# Patient Record
Sex: FEMALE | Race: BLACK OR AFRICAN AMERICAN | NOT HISPANIC OR LATINO | Employment: FULL TIME | URBAN - METROPOLITAN AREA
[De-identification: names, ages, dates, MRNs, and addresses within clinical notes are randomized per-mention and may not be internally consistent; named-entity substitution may affect disease eponyms.]

---

## 2021-05-03 ENCOUNTER — OFFICE VISIT (OUTPATIENT)
Dept: FAMILY MEDICINE CLINIC | Facility: CLINIC | Age: 51
End: 2021-05-03

## 2021-05-03 VITALS
HEIGHT: 67 IN | WEIGHT: 189.87 LBS | OXYGEN SATURATION: 97 % | TEMPERATURE: 97.5 F | BODY MASS INDEX: 29.8 KG/M2 | RESPIRATION RATE: 20 BRPM | HEART RATE: 74 BPM | SYSTOLIC BLOOD PRESSURE: 145 MMHG | DIASTOLIC BLOOD PRESSURE: 95 MMHG

## 2021-05-03 DIAGNOSIS — Z12.11 SCREEN FOR COLON CANCER: ICD-10-CM

## 2021-05-03 DIAGNOSIS — M15.8 OTHER OSTEOARTHRITIS INVOLVING MULTIPLE JOINTS: ICD-10-CM

## 2021-05-03 DIAGNOSIS — M54.50 CHRONIC BILATERAL LOW BACK PAIN WITHOUT SCIATICA: ICD-10-CM

## 2021-05-03 DIAGNOSIS — I10 ESSENTIAL HYPERTENSION: ICD-10-CM

## 2021-05-03 DIAGNOSIS — Z00.00 PREVENTATIVE HEALTH CARE: Primary | ICD-10-CM

## 2021-05-03 DIAGNOSIS — G89.29 CHRONIC BILATERAL LOW BACK PAIN WITHOUT SCIATICA: ICD-10-CM

## 2021-05-03 DIAGNOSIS — Z12.31 BREAST CANCER SCREENING BY MAMMOGRAM: ICD-10-CM

## 2021-05-03 LAB — HBA1C MFR BLD: 6 % (ref 3.5–5.6)

## 2021-05-03 PROCEDURE — 86803 HEPATITIS C AB TEST: CPT | Performed by: NURSE PRACTITIONER

## 2021-05-03 PROCEDURE — 84443 ASSAY THYROID STIM HORMONE: CPT | Performed by: NURSE PRACTITIONER

## 2021-05-03 PROCEDURE — 80061 LIPID PANEL: CPT | Performed by: NURSE PRACTITIONER

## 2021-05-03 PROCEDURE — 99386 PREV VISIT NEW AGE 40-64: CPT | Performed by: NURSE PRACTITIONER

## 2021-05-03 PROCEDURE — 80053 COMPREHEN METABOLIC PANEL: CPT | Performed by: NURSE PRACTITIONER

## 2021-05-03 PROCEDURE — 83036 HEMOGLOBIN GLYCOSYLATED A1C: CPT | Performed by: NURSE PRACTITIONER

## 2021-05-03 PROCEDURE — 85027 COMPLETE CBC AUTOMATED: CPT | Performed by: NURSE PRACTITIONER

## 2021-05-03 PROCEDURE — 36415 COLL VENOUS BLD VENIPUNCTURE: CPT | Performed by: NURSE PRACTITIONER

## 2021-05-03 RX ORDER — HYDROCODONE BITARTRATE AND ACETAMINOPHEN 10; 325 MG/1; MG/1
1 TABLET ORAL
COMMUNITY
Start: 2021-05-03 | End: 2021-06-02

## 2021-05-03 RX ORDER — TRIAMTERENE AND HYDROCHLOROTHIAZIDE 75; 50 MG/1; MG/1
1 TABLET ORAL DAILY
COMMUNITY
Start: 2021-02-22 | End: 2021-11-01 | Stop reason: SDUPTHER

## 2021-05-03 RX ORDER — MELOXICAM 7.5 MG/1
7.5 TABLET ORAL DAILY
COMMUNITY
Start: 2021-04-01 | End: 2021-11-01 | Stop reason: SDUPTHER

## 2021-05-03 NOTE — PROGRESS NOTES
"Chief Complaint  Establish Care (new pt est care htn anxiety ), Hypertension, Fatigue, Depression, and Anxiety    Subjective          Joey Rush presents to [facility]  History of Present Illness     New patient presents today to establish care. The patient denies dysuria, constipation, GERD, chest pain, shortness of air, palpitations and swelling of the extremities. Previous pt of Dr. Zuniga.     She works as / now doing 12 hour shifts 6 days/week. Forced to work on off days. She states she is \"broken down,\" she walked  22 blocks recently and was down for 3 days, her body hurts and she can not do it any longer, does not want to lose her job. She would like FMLA for 5 days/week/8 hour shifts/max 40 hours per week.  The symptoms are causing exacerbation and anxiety/depression although symptoms are manageable and tolerable at this time.     Low back back injury when in , also with osteoarthritis, on meloxicam daily and norco prn per pain mgmt at [facility], Dr. Redman, effective for pain most days.     She does not recall when she had her last mammogram and pap has been several years.  She does report having a cervical cuff s/p partial hysterectomy      The following portions of the patient's history were reviewed and updated as appropriate: allergies, current medications, past family history, past medical history, past social history, past surgical history and problem list.      Past Surgical History:   Procedure Laterality Date   • HYSTERECTOMY  2002    PARTIAL UTERUS ONLY     Past Medical History:   Diagnosis Date   • Allergic    • Anxiety disorder    • Contusion, knee    • Convulsion (CMS/HCC)    • Dehydration    • Depression    • Dizziness    • Fatigue    • Heat exhaustion    • Hypertension    • Insomnia, persistent    • Knee pain    • Low back pain    • Seizure (CMS/HCC)      PHQ-9 Depression Screening  Little interest or pleasure in doing " "things? 1   Feeling down, depressed, or hopeless? 1   Trouble falling or staying asleep, or sleeping too much? 1   Feeling tired or having little energy? 2   Poor appetite or overeating? 0   Feeling bad about yourself - or that you are a failure or have let yourself or your family down? 0   Trouble concentrating on things, such as reading the newspaper or watching television? 1   Moving or speaking so slowly that other people could have noticed? Or the opposite - being so fidgety or restless that you have been moving around a lot more than usual? 1   Thoughts that you would be better off dead, or of hurting yourself in some way? 0   PHQ-9 Total Score 7   If you checked off any problems, how difficult have these problems made it for you to do your work, take care of things at home, or get along with other people? Somewhat difficult         Objective   Vital Signs:   /95 (BP Location: Left arm, Patient Position: Sitting)   Pulse 74   Temp 97.5 °F (36.4 °C) (Temporal)   Resp 20   Ht 170.2 cm (67\")   Wt 86.1 kg (189 lb 13.9 oz)   SpO2 97%   BMI 29.74 kg/m²         Physical Exam  Vitals and nursing note reviewed.   Constitutional:       General: She is not in acute distress.     Appearance: She is well-developed. She is not diaphoretic.   Eyes:      Pupils: Pupils are equal, round, and reactive to light.   Neck:      Thyroid: No thyromegaly.      Vascular: No JVD.   Cardiovascular:      Rate and Rhythm: Normal rate and regular rhythm.      Heart sounds: Normal heart sounds. No murmur heard.     Pulmonary:      Effort: Pulmonary effort is normal. No respiratory distress.      Breath sounds: Normal breath sounds.   Abdominal:      General: Bowel sounds are normal. There is no distension.      Palpations: Abdomen is soft.      Tenderness: There is no abdominal tenderness.   Musculoskeletal:         General: No tenderness. Normal range of motion.      Cervical back: Normal range of motion and neck supple. "   Skin:     General: Skin is warm and dry.   Neurological:      Mental Status: She is alert and oriented to person, place, and time.      Sensory: No sensory deficit.   Psychiatric:         Behavior: Behavior normal.         Thought Content: Thought content normal.         Judgment: Judgment normal.          Result Review :                 Assessment and Plan    Diagnoses and all orders for this visit:    1. Preventative health care (Primary)  -     Hemoglobin A1c  -     Hepatitis C Antibody    2. Screen for colon cancer  -     Cologuard - Stool, Per Rectum; Future    3. Essential hypertension  -     Lipid Panel  -     Comprehensive Metabolic Panel  -     CBC (No Diff)  -     TSH    4. Breast cancer screening by mammogram  -     Mammo Screening Digital Tomosynthesis Bilateral With CAD; Future    5. Other osteoarthritis involving multiple joints    6. Chronic bilateral low back pain without sciatica    Other orders  -     Cancel: Ambulatory Referral to Gynecology       1. Ok for FMLA, pt should only work 5 days/wk, 8 hour shifts, max 40 hours/week, patient will bring paperwork for completion  2. Otherwise conditions stable, patient does not need med rf at this time, Continue Maxide  3. Collect labs today, will notify patient results  4. Cont melox, norco, f/u with pain management as directed  5.  Ordered mammogram and Cologuard  6.  Patient will return for Pap smear in next 1 to 2 months      I spent 35 minutes caring for Joey on this date of service. This time includes time spent by me in the following activities:preparing for the visit, reviewing tests, performing a medically appropriate examination and/or evaluation , counseling and educating the patient/family/caregiver, ordering medications, tests, or procedures and documenting information in the medical record     Follow Up   Return in about 6 months (around 11/3/2021) for schedule pap nxt 1-2 mo. f/u on HTN in 6mo.  Patient was given instructions and  counseling regarding her condition or for health maintenance advice. Please see specific information pulled into the AVS if appropriate.

## 2021-05-04 LAB
ALBUMIN SERPL-MCNC: 4.6 G/DL (ref 3.5–5.2)
ALBUMIN/GLOB SERPL: 1.5 G/DL
ALP SERPL-CCNC: 59 U/L (ref 39–117)
ALT SERPL W P-5'-P-CCNC: 23 U/L (ref 1–33)
ANION GAP SERPL CALCULATED.3IONS-SCNC: 10.1 MMOL/L (ref 5–15)
AST SERPL-CCNC: 22 U/L (ref 1–32)
BILIRUB SERPL-MCNC: 0.3 MG/DL (ref 0–1.2)
BUN SERPL-MCNC: 10 MG/DL (ref 6–20)
BUN/CREAT SERPL: 12.2 (ref 7–25)
CALCIUM SPEC-SCNC: 9.9 MG/DL (ref 8.6–10.5)
CHLORIDE SERPL-SCNC: 98 MMOL/L (ref 98–107)
CHOLEST SERPL-MCNC: 266 MG/DL (ref 0–200)
CO2 SERPL-SCNC: 28.9 MMOL/L (ref 22–29)
CREAT SERPL-MCNC: 0.82 MG/DL (ref 0.57–1)
DEPRECATED RDW RBC AUTO: 39.6 FL (ref 37–54)
ERYTHROCYTE [DISTWIDTH] IN BLOOD BY AUTOMATED COUNT: 12.4 % (ref 12.3–15.4)
GFR SERPL CREATININE-BSD FRML MDRD: 90 ML/MIN/1.73
GLOBULIN UR ELPH-MCNC: 3.1 GM/DL
GLUCOSE SERPL-MCNC: 135 MG/DL (ref 65–99)
HCT VFR BLD AUTO: 40.1 % (ref 34–46.6)
HCV AB SER DONR QL: NORMAL
HDLC SERPL-MCNC: 73 MG/DL (ref 40–60)
HGB BLD-MCNC: 13.5 G/DL (ref 12–15.9)
LDLC SERPL CALC-MCNC: 175 MG/DL (ref 0–100)
LDLC/HDLC SERPL: 2.36 {RATIO}
MCH RBC QN AUTO: 29.9 PG (ref 26.6–33)
MCHC RBC AUTO-ENTMCNC: 33.7 G/DL (ref 31.5–35.7)
MCV RBC AUTO: 88.7 FL (ref 79–97)
PLATELET # BLD AUTO: 407 10*3/MM3 (ref 140–450)
PMV BLD AUTO: 9.8 FL (ref 6–12)
POTASSIUM SERPL-SCNC: 3.5 MMOL/L (ref 3.5–5.2)
PROT SERPL-MCNC: 7.7 G/DL (ref 6–8.5)
RBC # BLD AUTO: 4.52 10*6/MM3 (ref 3.77–5.28)
SODIUM SERPL-SCNC: 137 MMOL/L (ref 136–145)
TRIGL SERPL-MCNC: 104 MG/DL (ref 0–150)
TSH SERPL DL<=0.05 MIU/L-ACNC: 1.89 UIU/ML (ref 0.27–4.2)
VLDLC SERPL-MCNC: 18 MG/DL (ref 5–40)
WBC # BLD AUTO: 3.69 10*3/MM3 (ref 3.4–10.8)

## 2021-05-13 ENCOUNTER — TELEPHONE (OUTPATIENT)
Dept: FAMILY MEDICINE CLINIC | Facility: CLINIC | Age: 51
End: 2021-05-13

## 2021-05-13 NOTE — TELEPHONE ENCOUNTER
Hub ok to share:    Patient may  [facility] paperwork.  Is there somewhere she would like for us to fax to?  If yes, please provide fax number.

## 2021-06-03 ENCOUNTER — OFFICE VISIT (OUTPATIENT)
Dept: FAMILY MEDICINE CLINIC | Facility: CLINIC | Age: 51
End: 2021-06-03

## 2021-06-03 VITALS
WEIGHT: 195.2 LBS | BODY MASS INDEX: 30.64 KG/M2 | DIASTOLIC BLOOD PRESSURE: 87 MMHG | HEIGHT: 67 IN | SYSTOLIC BLOOD PRESSURE: 136 MMHG | HEART RATE: 85 BPM | OXYGEN SATURATION: 100 %

## 2021-06-03 DIAGNOSIS — Z01.419 WELL WOMAN EXAM WITH ROUTINE GYNECOLOGICAL EXAM: Primary | ICD-10-CM

## 2021-06-03 PROCEDURE — 99213 OFFICE O/P EST LOW 20 MIN: CPT | Performed by: NURSE PRACTITIONER

## 2021-06-03 NOTE — PROGRESS NOTES
"Chief Complaint  Gynecologic Exam    Subjective          Joey Rush presents to [facility] for   History of Present Illness       Patient here for annual/gynecologic exam with Pap smear, mammogram previously ordered but not completed yet.  Patient with history of partial hysterectomy d/t fibroids with cervical cuff and ovaries present.  Pt denies breast pain, performs monthly self exams, denies abdominal pain, dysuria, hematuria, or vaginal discharge.     cologuard negative 5/22/21    Would also like to review labs completed 5/30/2021      The following portions of the patient's history were reviewed and updated as appropriate: allergies, current medications, past family history, past medical history, past social history, past surgical history and problem list.    Past Medical History:   Diagnosis Date   • Allergic    • Anxiety disorder    • Contusion, knee    • Convulsion (CMS/HCC)    • Dehydration    • Depression    • Dizziness    • Fatigue    • Heat exhaustion    • Hypertension    • Insomnia, persistent    • Knee pain    • Low back pain    • Seizure (CMS/HCC)      Past Surgical History:   Procedure Laterality Date   • HYSTERECTOMY  2002    PARTIAL UTERUS ONLY     Family History   Problem Relation Age of Onset   • Diabetes Other    • Hypertension Other    • Cancer Other      Social History     Tobacco Use   • Smoking status: Never Smoker   • Smokeless tobacco: Never Used   Substance Use Topics   • Alcohol use: Yes     Comment: social       Current Outpatient Medications:   •  meloxicam (MOBIC) 7.5 MG tablet, Take 7.5 mg by mouth Daily., Disp: , Rfl:   •  triamterene-hydrochlorothiazide (MAXZIDE) 75-50 MG per tablet, Take 1 tablet by mouth Daily., Disp: , Rfl:     Objective   Vital Signs:   /87 (BP Location: Left arm, Patient Position: Sitting, Cuff Size: Adult)   Pulse 85   Ht 170.2 cm (67.01\")   Wt 88.5 kg (195 lb 3.2 oz)   SpO2 100%   BMI 30.57 kg/m²       Physical " Exam  Exam conducted with a chaperone present.   Constitutional:       General: She is not in acute distress.     Appearance: She is well-developed.   HENT:      Head: Normocephalic.   Eyes:      Pupils: Pupils are equal, round, and reactive to light.   Neck:      Thyroid: No thyromegaly.   Cardiovascular:      Rate and Rhythm: Normal rate and regular rhythm.      Heart sounds: Normal heart sounds. No murmur heard.     Pulmonary:      Effort: Pulmonary effort is normal. No respiratory distress.      Breath sounds: Normal breath sounds.   Abdominal:      General: Bowel sounds are normal. There is no distension.      Palpations: Abdomen is soft.      Tenderness: There is no abdominal tenderness.   Genitourinary:     General: Normal vulva.      Labia:         Right: No rash or lesion.         Left: No rash or lesion.       Vagina: Normal. No vaginal discharge.      Cervix: Normal.      Uterus: Absent.       Adnexa: Right adnexa normal and left adnexa normal.        Right: No mass or tenderness.          Left: No mass or tenderness.        Rectum: Normal.      Comments: Pap collected, breast exam completed. Pt power well.   Musculoskeletal:         General: Normal range of motion.      Cervical back: Normal range of motion.   Skin:     General: Skin is warm and dry.   Neurological:      Mental Status: She is alert and oriented to person, place, and time.   Psychiatric:         Behavior: Behavior normal.         Thought Content: Thought content normal.         Judgment: Judgment normal.          Result Review :     No visits with results within 7 Day(s) from this visit.   Latest known visit with results is:   Office Visit on 05/03/2021   Component Date Value Ref Range Status   • Total Cholesterol 05/03/2021 266* 0 - 200 mg/dL Final   • Triglycerides 05/03/2021 104  0 - 150 mg/dL Final   • HDL Cholesterol 05/03/2021 73* 40 - 60 mg/dL Final   • LDL Cholesterol  05/03/2021 175* 0 - 100 mg/dL Final   • VLDL Cholesterol  05/03/2021 18  5 - 40 mg/dL Final   • LDL/HDL Ratio 05/03/2021 2.36   Final   • Glucose 05/03/2021 135* 65 - 99 mg/dL Final   • BUN 05/03/2021 10  6 - 20 mg/dL Final   • Creatinine 05/03/2021 0.82  0.57 - 1.00 mg/dL Final   • Sodium 05/03/2021 137  136 - 145 mmol/L Final   • Potassium 05/03/2021 3.5  3.5 - 5.2 mmol/L Final   • Chloride 05/03/2021 98  98 - 107 mmol/L Final   • CO2 05/03/2021 28.9  22.0 - 29.0 mmol/L Final   • Calcium 05/03/2021 9.9  8.6 - 10.5 mg/dL Final   • Total Protein 05/03/2021 7.7  6.0 - 8.5 g/dL Final   • Albumin 05/03/2021 4.60  3.50 - 5.20 g/dL Final   • ALT (SGPT) 05/03/2021 23  1 - 33 U/L Final   • AST (SGOT) 05/03/2021 22  1 - 32 U/L Final   • Alkaline Phosphatase 05/03/2021 59  39 - 117 U/L Final   • Total Bilirubin 05/03/2021 0.3  0.0 - 1.2 mg/dL Final   • eGFR   Amer 05/03/2021 90  >60 mL/min/1.73 Final   • Globulin 05/03/2021 3.1  gm/dL Final   • A/G Ratio 05/03/2021 1.5  g/dL Final   • BUN/Creatinine Ratio 05/03/2021 12.2  7.0 - 25.0 Final   • Anion Gap 05/03/2021 10.1  5.0 - 15.0 mmol/L Final   • WBC 05/03/2021 3.69  3.40 - 10.80 10*3/mm3 Final   • RBC 05/03/2021 4.52  3.77 - 5.28 10*6/mm3 Final   • Hemoglobin 05/03/2021 13.5  12.0 - 15.9 g/dL Final   • Hematocrit 05/03/2021 40.1  34.0 - 46.6 % Final   • MCV 05/03/2021 88.7  79.0 - 97.0 fL Final   • MCH 05/03/2021 29.9  26.6 - 33.0 pg Final   • MCHC 05/03/2021 33.7  31.5 - 35.7 g/dL Final   • RDW 05/03/2021 12.4  12.3 - 15.4 % Final   • RDW-SD 05/03/2021 39.6  37.0 - 54.0 fl Final   • MPV 05/03/2021 9.8  6.0 - 12.0 fL Final   • Platelets 05/03/2021 407  140 - 450 10*3/mm3 Final   • TSH 05/03/2021 1.890  0.270 - 4.200 uIU/mL Final   • Hemoglobin A1C 05/03/2021 6.0* 3.5 - 5.6 % Final   • Hepatitis C Ab 05/03/2021 Non-Reactive  Non-Reactive Final                       Assessment and Plan    Diagnoses and all orders for this visit:    1. Well woman exam with routine gynecological exam (Primary)  Comments:  1. pap collected, will  notify results.    2. Patient to schedule mammogram.    3. Cologuard negative.   4. Reviewed labs, work on HHD, limit carbs/sweets        I spent 20 minutes caring for Joey Rush on this date of service. This time includes time spent by me in the following activities: preparing for the visit, reviewing tests, performing a medically appropriate examination and/or evaluation , counseling and educating the patient/family/caregiver, ordering medications, tests, or procedures and documenting information in the medical record        Follow Up     No follow-ups on file.  Patient was given instructions and counseling regarding her condition or for health maintenance advice. Please see specific information pulled into the AVS if appropriate.

## 2021-06-06 LAB
AGE GDLN ACOG TESTING: NORMAL
CYTOLOGIST CVX/VAG CYTO: NORMAL
CYTOLOGY CVX/VAG DOC CYTO: NORMAL
CYTOLOGY CVX/VAG DOC THIN PREP: NORMAL
DX ICD CODE: NORMAL
HIV 1 & 2 AB SER-IMP: NORMAL
HPV I/H RISK 4 DNA CVX QL PROBE+SIG AMP: NEGATIVE
OTHER STN SPEC: NORMAL
STAT OF ADQ CVX/VAG CYTO-IMP: NORMAL

## 2021-07-07 ENCOUNTER — HOSPITAL ENCOUNTER (OUTPATIENT)
Dept: MAMMOGRAPHY | Facility: HOSPITAL | Age: 51
Discharge: HOME | End: 2021-07-07
Admitting: NURSE PRACTITIONER

## 2021-07-07 DIAGNOSIS — Z12.31 BREAST CANCER SCREENING BY MAMMOGRAM: ICD-10-CM

## 2021-07-07 PROCEDURE — 77063 BREAST TOMOSYNTHESIS BI: CPT

## 2021-07-07 PROCEDURE — 77067 SCR MAMMO BI INCL CAD: CPT

## 2021-11-01 ENCOUNTER — OFFICE VISIT (OUTPATIENT)
Dept: FAMILY MEDICINE CLINIC | Facility: CLINIC | Age: 51
End: 2021-11-01

## 2021-11-01 VITALS
HEART RATE: 88 BPM | OXYGEN SATURATION: 100 % | WEIGHT: 204.2 LBS | SYSTOLIC BLOOD PRESSURE: 133 MMHG | BODY MASS INDEX: 32.05 KG/M2 | HEIGHT: 67 IN | DIASTOLIC BLOOD PRESSURE: 88 MMHG

## 2021-11-01 DIAGNOSIS — R61 NIGHT SWEATS: ICD-10-CM

## 2021-11-01 DIAGNOSIS — M15.8 OTHER OSTEOARTHRITIS INVOLVING MULTIPLE JOINTS: ICD-10-CM

## 2021-11-01 DIAGNOSIS — G47.09 OTHER INSOMNIA: ICD-10-CM

## 2021-11-01 DIAGNOSIS — I10 ESSENTIAL HYPERTENSION: Primary | ICD-10-CM

## 2021-11-01 PROCEDURE — 99214 OFFICE O/P EST MOD 30 MIN: CPT | Performed by: NURSE PRACTITIONER

## 2021-11-01 RX ORDER — MELOXICAM 7.5 MG/1
7.5 TABLET ORAL DAILY
Qty: 90 TABLET | Refills: 1 | Status: SHIPPED | OUTPATIENT
Start: 2021-11-01 | End: 2022-09-23

## 2021-11-01 RX ORDER — HYDROCODONE BITARTRATE AND ACETAMINOPHEN 10; 325 MG/1; MG/1
TABLET ORAL
COMMUNITY
Start: 2021-10-29

## 2021-11-01 RX ORDER — TRIAMTERENE AND HYDROCHLOROTHIAZIDE 75; 50 MG/1; MG/1
1 TABLET ORAL DAILY
Qty: 90 TABLET | Refills: 1 | Status: SHIPPED | OUTPATIENT
Start: 2021-11-01 | End: 2022-06-03

## 2021-11-01 NOTE — PROGRESS NOTES
Chief Complaint  Hypertension, Follow-up (6 mo), Insomnia (pt reports that she has tried otc meds and isn't helping.  also expc night sweats that come and go.), and Immunizations (pt would like to discuss getting the flu vaccine)    Subjective          Joey Ruhs presents to [facility] for   History of Present Illness     HTN, stable on meds and takes as directed, denies chest pain, headache, shortness of air, palpitations and swelling of extremities.     Insomnia, This is a new problem, pt reports having hotflashes/sweating. She is also taking care of a grandchild. Treatments tried: zquil, OTC sleep aids ineffective.     Elevated blood glucose, per previous labs, but not fasting. hgba1c 6.0      The following portions of the patient's history were reviewed and updated as appropriate: allergies, current medications, past family history, past medical history, past social history, past surgical history and problem list.    Past Medical History:   Diagnosis Date   • Allergic    • Anxiety disorder    • Contusion, knee    • Convulsion (HCC)    • Dehydration    • Depression    • Dizziness    • Fatigue    • Heat exhaustion    • Hypertension    • Insomnia, persistent    • Knee pain    • Low back pain    • Seizure (HCC)      Past Surgical History:   Procedure Laterality Date   • BREAST BIOPSY Left    • HYSTERECTOMY  2002    PARTIAL UTERUS ONLY     Family History   Problem Relation Age of Onset   • Diabetes Other    • Hypertension Other    • Cancer Other    • Breast cancer Paternal Aunt      Social History     Tobacco Use   • Smoking status: Never Smoker   • Smokeless tobacco: Never Used   Substance Use Topics   • Alcohol use: Yes     Comment: social       Current Outpatient Medications:   •  HYDROcodone-acetaminophen (NORCO)  MG per tablet, , Disp: , Rfl:   •  meloxicam (MOBIC) 7.5 MG tablet, Take 1 tablet by mouth Daily., Disp: 90 tablet, Rfl: 1  •  triamterene-hydrochlorothiazide  "(MAXZIDE) 75-50 MG per tablet, Take 1 tablet by mouth Daily., Disp: 90 tablet, Rfl: 1    Objective   Vital Signs:   /88 (BP Location: Left arm, Patient Position: Sitting, Cuff Size: Large Adult)   Pulse 88   Ht 170.2 cm (67.01\")   Wt 92.6 kg (204 lb 3.2 oz)   SpO2 100%   BMI 31.97 kg/m²       Physical Exam  Vitals and nursing note reviewed.   Constitutional:       General: She is not in acute distress.     Appearance: She is well-developed. She is not diaphoretic.   Eyes:      Pupils: Pupils are equal, round, and reactive to light.   Neck:      Thyroid: No thyromegaly.      Vascular: No JVD.   Cardiovascular:      Rate and Rhythm: Normal rate and regular rhythm.      Heart sounds: Normal heart sounds. No murmur heard.      Pulmonary:      Effort: Pulmonary effort is normal. No respiratory distress.      Breath sounds: Normal breath sounds.   Abdominal:      General: Bowel sounds are normal. There is no distension.      Palpations: Abdomen is soft.      Tenderness: There is no abdominal tenderness.   Musculoskeletal:         General: No tenderness. Normal range of motion.      Cervical back: Normal range of motion and neck supple.   Skin:     General: Skin is warm and dry.   Neurological:      Mental Status: She is alert and oriented to person, place, and time.      Sensory: No sensory deficit.   Psychiatric:         Behavior: Behavior normal.         Thought Content: Thought content normal.         Judgment: Judgment normal.          Result Review :     No visits with results within 7 Day(s) from this visit.   Latest known visit with results is:   Office Visit on 06/03/2021   Component Date Value Ref Range Status   • Age Gdln ACOG Testing 06/03/2021 30-65   Final   • Diagnosis 06/03/2021 Comment   Final    NEGATIVE FOR INTRAEPITHELIAL LESION OR MALIGNANCY.   • Specimen adequacy: 06/03/2021 Comment   Final    Satisfactory for evaluation.  Endocervical and/or squamous metaplastic  cells (endocervical " component) are present.   • Clinician Provided ICD-10: 06/03/2021 Comment   Final    Z01.419   • Performed by: 06/03/2021 Comment   Final    Mindi Gregorio, Cytotechnologist (ASCP)   • . 06/03/2021 .   Final   • Note: 06/03/2021 Comment   Final    The Pap smear is a screening test designed to aid in the detection of  premalignant and malignant conditions of the uterine cervix.  It is not a  diagnostic procedure and should not be used as the sole means of detecting  cervical cancer.  Both false-positive and false-negative reports do occur.   • Method: 06/03/2021 Comment   Final    This liquid based ThinPrep(R) pap test was screened with the  use of an image guided system.   • HPV Aptima 06/03/2021 Negative  Negative Final    This nucleic acid amplification test detects fourteen high-risk  HPV types (16,18,31,33,35,39,45,51,52,56,58,59,66,68) without  differentiation.                       Assessment and Plan    Diagnoses and all orders for this visit:    1. Essential hypertension (Primary)  Comments:  bp stable, cont/rf maxzide. reviewed prev labs and stable.       2. Other insomnia  Comments:  likely 2/2 perimenopausal s/s. notify if no improvement with black cohosh.     3. Night sweats  Comments:  rec trial black cohosh otc. consider hormone workup.     4. Other osteoarthritis involving multiple joints  Comments:  stable with prn use mobic, cont and rf.     Other orders  -     triamterene-hydrochlorothiazide (MAXZIDE) 75-50 MG per tablet; Take 1 tablet by mouth Daily.  Dispense: 90 tablet; Refill: 1  -     meloxicam (MOBIC) 7.5 MG tablet; Take 1 tablet by mouth Daily.  Dispense: 90 tablet; Refill: 1      rec flu shot, pt will consider getting at her pharmacy.     I spent 30 minutes caring for Joey Rush on this date of service. This time includes time spent by me in the following activities: preparing for the visit, reviewing tests, performing a medically appropriate examination and/or evaluation , counseling  and educating the patient/family/caregiver, ordering medications, tests, or procedures and documenting information in the medical record        Follow Up     Return in about 6 months (around 5/1/2022) for htn. .  Patient was given instructions and counseling regarding her condition or for health maintenance advice. Please see specific information pulled into the AVS if appropriate.      EMR Dragon transcription disclaimer:  Some of this encounter note is an electronic transcription translation of spoken language to printed text. The electronic translation of spoken language may permit erroneous, or at times, nonsensical words or phrases to be inadvertently transcribed; Although I have reviewed the note for such errors some may still exist.

## 2022-06-02 NOTE — TELEPHONE ENCOUNTER
Pt. Requesting a refill of   triamterene-hydrochlorothiazide (MAXZIDE) 75-50 MG per tablet     06/02/22 [facility]

## 2022-06-03 RX ORDER — TRIAMTERENE AND HYDROCHLOROTHIAZIDE 75; 50 MG/1; MG/1
1 TABLET ORAL DAILY
Qty: 90 TABLET | Refills: 1 | Status: SHIPPED | OUTPATIENT
Start: 2022-06-03 | End: 2022-12-06

## 2022-09-23 RX ORDER — MELOXICAM 7.5 MG/1
7.5 TABLET ORAL DAILY
Qty: 90 TABLET | Refills: 1 | Status: SHIPPED | OUTPATIENT
Start: 2022-09-23

## 2022-12-06 DIAGNOSIS — I10 ESSENTIAL HYPERTENSION: Primary | ICD-10-CM

## 2022-12-06 RX ORDER — TRIAMTERENE AND HYDROCHLOROTHIAZIDE 75; 50 MG/1; MG/1
1 TABLET ORAL DAILY
Qty: 90 TABLET | Refills: 0 | Status: SHIPPED | OUTPATIENT
Start: 2022-12-06

## 2023-03-11 DIAGNOSIS — I10 ESSENTIAL HYPERTENSION: ICD-10-CM

## 2023-03-13 RX ORDER — TRIAMTERENE AND HYDROCHLOROTHIAZIDE 75; 50 MG/1; MG/1
1 TABLET ORAL DAILY
Qty: 90 TABLET | Refills: 0 | OUTPATIENT
Start: 2023-03-13

## 2023-05-01 ENCOUNTER — TELEPHONE (OUTPATIENT)
Dept: FAMILY MEDICINE CLINIC | Facility: CLINIC | Age: 53
End: 2023-05-01
Payer: COMMERCIAL

## 2023-05-01 NOTE — TELEPHONE ENCOUNTER
"Attempted to call pt to confirm 5/3 appt, no answer, \"call cannot be completed at this time, please try your call again later\"  "

## 2023-05-03 ENCOUNTER — OFFICE VISIT (OUTPATIENT)
Dept: FAMILY MEDICINE CLINIC | Facility: CLINIC | Age: 53
End: 2023-05-03
Payer: COMMERCIAL

## 2023-05-03 VITALS
WEIGHT: 193 LBS | SYSTOLIC BLOOD PRESSURE: 126 MMHG | HEIGHT: 67 IN | OXYGEN SATURATION: 97 % | DIASTOLIC BLOOD PRESSURE: 84 MMHG | HEART RATE: 95 BPM | BODY MASS INDEX: 30.29 KG/M2

## 2023-05-03 DIAGNOSIS — Z23 NEED FOR SHINGLES VACCINE: ICD-10-CM

## 2023-05-03 DIAGNOSIS — Z00.00 PREVENTATIVE HEALTH CARE: Primary | ICD-10-CM

## 2023-05-03 DIAGNOSIS — M15.8 OTHER OSTEOARTHRITIS INVOLVING MULTIPLE JOINTS: ICD-10-CM

## 2023-05-03 DIAGNOSIS — Z23 NEED FOR TDAP VACCINATION: ICD-10-CM

## 2023-05-03 DIAGNOSIS — Z12.31 BREAST CANCER SCREENING BY MAMMOGRAM: ICD-10-CM

## 2023-05-03 DIAGNOSIS — I10 ESSENTIAL HYPERTENSION: ICD-10-CM

## 2023-05-03 DIAGNOSIS — R73.09 ABNORMAL GLUCOSE: ICD-10-CM

## 2023-05-03 PROCEDURE — 83036 HEMOGLOBIN GLYCOSYLATED A1C: CPT | Performed by: NURSE PRACTITIONER

## 2023-05-03 PROCEDURE — 80061 LIPID PANEL: CPT | Performed by: NURSE PRACTITIONER

## 2023-05-03 PROCEDURE — 80050 GENERAL HEALTH PANEL: CPT | Performed by: NURSE PRACTITIONER

## 2023-05-03 RX ORDER — MELOXICAM 7.5 MG/1
7.5 TABLET ORAL DAILY
Qty: 90 TABLET | Refills: 3 | Status: SHIPPED | OUTPATIENT
Start: 2023-05-03

## 2023-05-03 RX ORDER — TRIAMTERENE AND HYDROCHLOROTHIAZIDE 75; 50 MG/1; MG/1
1 TABLET ORAL DAILY
Qty: 90 TABLET | Refills: 3 | Status: SHIPPED | OUTPATIENT
Start: 2023-05-03

## 2023-05-03 NOTE — PROGRESS NOTES
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  Pap up-to-date, negative in 2021, last mammogram 2021    HTN, stable on meds and takes as directed, denies chest pain, headache, shortness of air, palpitations and swelling of extremities.     Arthritis of multiple joints, uses meloxicam as needed. Goes to pain mgmt as well, uses norco BID prn.  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    Past Medical History:   Diagnosis Date   • Allergic    • Anxiety disorder    • Contusion, knee    • Convulsion    • Dehydration    • Depression    • Dizziness    • Fatigue    • Heat exhaustion    • Hypertension    • Insomnia, persistent    • Knee pain    • Low back pain    • Seizure      Past Surgical History:   Procedure Laterality Date   • BREAST BIOPSY Left    • HYSTERECTOMY  2002    PARTIAL UTERUS ONLY     Family History   Problem Relation Age of Onset   • Diabetes Other    • Hypertension Other    • Cancer Other    • Breast cancer Paternal Aunt      Social History     Tobacco Use " "  • Smoking status: Never   • Smokeless tobacco: Never   Substance Use Topics   • Alcohol use: Yes     Comment: social       Current Outpatient Medications:   •  HYDROcodone-acetaminophen (NORCO)  MG per tablet, , Disp: , Rfl:   •  meloxicam (MOBIC) 7.5 MG tablet, Take 1 tablet by mouth Daily., Disp: 90 tablet, Rfl: 3  •  triamterene-hydrochlorothiazide (MAXZIDE) 75-50 MG per tablet, Take 1 tablet by mouth Daily., Disp: 90 tablet, Rfl: 3    Objective   Vital Signs:   /84 (BP Location: Left arm, Patient Position: Sitting, Cuff Size: Large Adult)   Pulse 95   Ht 170.2 cm (67.01\")   Wt 87.5 kg (193 lb)   SpO2 97%   BMI 30.22 kg/m²           Physical Exam  Constitutional:       General: She is not in acute distress.     Appearance: Normal appearance. She is well-developed. She is not ill-appearing or diaphoretic.   HENT:      Head: Normocephalic.   Eyes:      Conjunctiva/sclera: Conjunctivae normal.      Pupils: Pupils are equal, round, and reactive to light.   Neck:      Thyroid: No thyromegaly.      Vascular: No JVD.   Cardiovascular:      Rate and Rhythm: Normal rate and regular rhythm.      Heart sounds: Normal heart sounds. No murmur heard.  Pulmonary:      Effort: Pulmonary effort is normal. No respiratory distress.      Breath sounds: Normal breath sounds. No wheezing or rhonchi.   Abdominal:      General: Bowel sounds are normal. There is no distension.      Palpations: Abdomen is soft.      Tenderness: There is no abdominal tenderness.   Musculoskeletal:         General: Tenderness (OA multi joints, mild craig rom) present. No swelling. Normal range of motion.      Cervical back: Normal range of motion and neck supple. No tenderness.   Lymphadenopathy:      Cervical: No cervical adenopathy.   Skin:     General: Skin is warm and dry.      Coloration: Skin is not jaundiced.      Findings: No erythema or rash.   Neurological:      General: No focal deficit present.      Mental Status: She is alert " and oriented to person, place, and time. Mental status is at baseline.      Sensory: No sensory deficit.   Psychiatric:         Mood and Affect: Mood normal.         Behavior: Behavior normal.         Thought Content: Thought content normal.         Judgment: Judgment normal.          Result Review :     Office Visit on 05/03/2023   Component Date Value Ref Range Status   • Total Cholesterol 05/03/2023 255 (H)  0 - 200 mg/dL Final   • Triglycerides 05/03/2023 161 (H)  0 - 150 mg/dL Final   • HDL Cholesterol 05/03/2023 72 (H)  40 - 60 mg/dL Final   • LDL Cholesterol  05/03/2023 154 (H)  0 - 100 mg/dL Final   • VLDL Cholesterol 05/03/2023 29  5 - 40 mg/dL Final   • LDL/HDL Ratio 05/03/2023 2.09   Final   • Glucose 05/03/2023 89  65 - 99 mg/dL Final   • BUN 05/03/2023 10  6 - 20 mg/dL Final   • Creatinine 05/03/2023 0.78  0.57 - 1.00 mg/dL Final   • Sodium 05/03/2023 138  136 - 145 mmol/L Final   • Potassium 05/03/2023 4.1  3.5 - 5.2 mmol/L Final   • Chloride 05/03/2023 105  98 - 107 mmol/L Final   • CO2 05/03/2023 23.7  22.0 - 29.0 mmol/L Final   • Calcium 05/03/2023 9.8  8.6 - 10.5 mg/dL Final   • Total Protein 05/03/2023 7.2  6.0 - 8.5 g/dL Final   • Albumin 05/03/2023 4.4  3.5 - 5.2 g/dL Final   • ALT (SGPT) 05/03/2023 28  1 - 33 U/L Final   • AST (SGOT) 05/03/2023 20  1 - 32 U/L Final   • Alkaline Phosphatase 05/03/2023 53  39 - 117 U/L Final   • Total Bilirubin 05/03/2023 <0.2  0.0 - 1.2 mg/dL Final   • Globulin 05/03/2023 2.8  gm/dL Final   • A/G Ratio 05/03/2023 1.6  g/dL Final   • BUN/Creatinine Ratio 05/03/2023 12.8  7.0 - 25.0 Final   • Anion Gap 05/03/2023 9.3  5.0 - 15.0 mmol/L Final   • eGFR 05/03/2023 92.1  >60.0 mL/min/1.73 Final   • WBC 05/03/2023 3.00 (L)  3.40 - 10.80 10*3/mm3 Final   • RBC 05/03/2023 4.49  3.77 - 5.28 10*6/mm3 Final   • Hemoglobin 05/03/2023 13.5  12.0 - 15.9 g/dL Final   • Hematocrit 05/03/2023 40.0  34.0 - 46.6 % Final   • MCV 05/03/2023 89.1  79.0 - 97.0 fL Final   • MCH  05/03/2023 30.1  26.6 - 33.0 pg Final   • MCHC 05/03/2023 33.8  31.5 - 35.7 g/dL Final   • RDW 05/03/2023 12.2 (L)  12.3 - 15.4 % Final   • RDW-SD 05/03/2023 39.9  37.0 - 54.0 fl Final   • MPV 05/03/2023 9.8  6.0 - 12.0 fL Final   • Platelets 05/03/2023 336  140 - 450 10*3/mm3 Final   • TSH 05/03/2023 1.550  0.270 - 4.200 uIU/mL Final   • Hemoglobin A1C 05/03/2023 6.00 (H)  4.80 - 5.60 % Final                  BMI is >= 30 and <35. (Class 1 Obesity). The following options were offered after discussion;: exercise counseling/recommendations and nutrition counseling/recommendations           Assessment and Plan    Diagnoses and all orders for this visit:    1. Preventative health care (Primary)  Comments:  Shingrix No. 1 today, RTC 10 weeks for Shingrix No. 2  Tdap today  Pap/cologuard due 2024  Mammogram ordered  Labs today as ordered, will notify results    Orders:  -     Lipid Panel  -     Comprehensive Metabolic Panel  -     CBC (No Diff)  -     TSH  -     Hemoglobin A1c    2. Need for Tdap vaccination  -     Tdap Vaccine Greater Than or Equal To 8yo IM    3. Need for shingles vaccine  -     Shingrix Vaccine    4. Essential hypertension  Comments:  BP stable, continue and refill Maxide  Orders:  -     triamterene-hydrochlorothiazide (MAXZIDE) 75-50 MG per tablet; Take 1 tablet by mouth Daily.  Dispense: 90 tablet; Refill: 3  -     Lipid Panel  -     Comprehensive Metabolic Panel  -     CBC (No Diff)  -     TSH    5. Breast cancer screening by mammogram  -     Mammo Screening Digital Tomosynthesis Bilateral With CAD; Future    6. Other osteoarthritis involving multiple joints  Comments:  Continue with pain management for Norco  Refill meloxicam to use as needed    Orders:  -     meloxicam (MOBIC) 7.5 MG tablet; Take 1 tablet by mouth Daily.  Dispense: 90 tablet; Refill: 3    7. Abnormal glucose  -     Hemoglobin A1c    Work note provided to work only 8-hour shifts, 5 days/week.  We will complete [place] paperwork if  needed      I spent 30 minutes caring for Joey Rush on this date of service. This time includes time spent by me in the following activities: preparing for the visit, reviewing tests, performing a medically appropriate examination and/or evaluation , counseling and educating the patient/family/caregiver, ordering medications, tests, or procedures and documenting information in the medical record        Follow Up     Return in about 6 months (around 11/3/2023) for Annual physical, RTC 10 weeks for Shingrix No. 2.  Patient was given instructions and counseling regarding her condition or for health maintenance advice. Please see specific information pulled into the AVS if appropriate.        Part of this note may be an electronic transcription/translation of spoken language to printed text using the Dragon Dictation System

## 2023-05-03 NOTE — PROGRESS NOTES
Injection  Injections performed in left arm by Neha Olivas MA. Patient tolerated the procedure well without complications.  05/03/23   Neha Olivas MA      Venipuncture Blood Specimen Collection  Venipuncture performed in right arm by Neha Olivas MA with good hemostasis. Patient tolerated the procedure well without complications.   05/03/23   Neha Olivas MA

## 2023-05-04 LAB
ALBUMIN SERPL-MCNC: 4.4 G/DL (ref 3.5–5.2)
ALBUMIN/GLOB SERPL: 1.6 G/DL
ALP SERPL-CCNC: 53 U/L (ref 39–117)
ALT SERPL W P-5'-P-CCNC: 28 U/L (ref 1–33)
ANION GAP SERPL CALCULATED.3IONS-SCNC: 9.3 MMOL/L (ref 5–15)
AST SERPL-CCNC: 20 U/L (ref 1–32)
BILIRUB SERPL-MCNC: <0.2 MG/DL (ref 0–1.2)
BUN SERPL-MCNC: 10 MG/DL (ref 6–20)
BUN/CREAT SERPL: 12.8 (ref 7–25)
CALCIUM SPEC-SCNC: 9.8 MG/DL (ref 8.6–10.5)
CHLORIDE SERPL-SCNC: 105 MMOL/L (ref 98–107)
CHOLEST SERPL-MCNC: 255 MG/DL (ref 0–200)
CO2 SERPL-SCNC: 23.7 MMOL/L (ref 22–29)
CREAT SERPL-MCNC: 0.78 MG/DL (ref 0.57–1)
DEPRECATED RDW RBC AUTO: 39.9 FL (ref 37–54)
EGFRCR SERPLBLD CKD-EPI 2021: 92.1 ML/MIN/1.73
ERYTHROCYTE [DISTWIDTH] IN BLOOD BY AUTOMATED COUNT: 12.2 % (ref 12.3–15.4)
GLOBULIN UR ELPH-MCNC: 2.8 GM/DL
GLUCOSE SERPL-MCNC: 89 MG/DL (ref 65–99)
HBA1C MFR BLD: 6 % (ref 4.8–5.6)
HCT VFR BLD AUTO: 40 % (ref 34–46.6)
HDLC SERPL-MCNC: 72 MG/DL (ref 40–60)
HGB BLD-MCNC: 13.5 G/DL (ref 12–15.9)
LDLC SERPL CALC-MCNC: 154 MG/DL (ref 0–100)
LDLC/HDLC SERPL: 2.09 {RATIO}
MCH RBC QN AUTO: 30.1 PG (ref 26.6–33)
MCHC RBC AUTO-ENTMCNC: 33.8 G/DL (ref 31.5–35.7)
MCV RBC AUTO: 89.1 FL (ref 79–97)
PLATELET # BLD AUTO: 336 10*3/MM3 (ref 140–450)
PMV BLD AUTO: 9.8 FL (ref 6–12)
POTASSIUM SERPL-SCNC: 4.1 MMOL/L (ref 3.5–5.2)
PROT SERPL-MCNC: 7.2 G/DL (ref 6–8.5)
RBC # BLD AUTO: 4.49 10*6/MM3 (ref 3.77–5.28)
SODIUM SERPL-SCNC: 138 MMOL/L (ref 136–145)
TRIGL SERPL-MCNC: 161 MG/DL (ref 0–150)
TSH SERPL DL<=0.05 MIU/L-ACNC: 1.55 UIU/ML (ref 0.27–4.2)
VLDLC SERPL-MCNC: 29 MG/DL (ref 5–40)
WBC NRBC COR # BLD: 3 10*3/MM3 (ref 3.4–10.8)

## 2023-05-08 ENCOUNTER — TELEPHONE (OUTPATIENT)
Dept: FAMILY MEDICINE CLINIC | Facility: CLINIC | Age: 53
End: 2023-05-08

## 2023-05-08 NOTE — TELEPHONE ENCOUNTER
"Caller: Joey Rush    Relationship: Self    Best call back number: [phone](HUSBANDS NUMBER)      What form or medical record are you requesting: RESTRICTIONS FOR WORK    Who is requesting this form or medical record from you:EMPLOYER    How would you like to receive the form or medical records (pick-up, mail, fax):PICK-UP    Timeframe paperwork needed: ASAP    Additional notes: PATIENT STATED THAT HER EMPLOYER TOLD HER THAT THE NOTE FROM 5/3/23 FROM CANDY GARRIDO NEEDED TO BE RE-WORDED. PATIENT STATED THAT HER EMPLOYER TOLD HER THE NOTE SAID \"IT IS MY MEDICAL OPINION\" AND THEY CAN NOT ACCEPT AN OPINION. PATIENT STATED THAT IT JUST NEEDS TO SAY THE WORK RESTRICTIONS. PATIENT STATES THAT HER  CHIDI DE LEON WILL BE COMING TO  THE PAPERWORK WHENEVER IT IS READY.    "

## 2023-05-08 NOTE — TELEPHONE ENCOUNTER
Caller: Kajal Rush     Relationship: Self     Best call back number: [phone](HUSBANDS NUMBER)        What form or medical record are you requesting: RESTRICTIONS FOR WORK     Who is requesting this form or medical record from you:EMPLOYER     How would you like to receive the form or medical records (pick-up, mail, fax):PICK-UP     Timeframe paperwork needed: ASAP     Additional notes: PATIENT CALLED TO INFORM OFFICE THAT WORDING HAS TO INCLUDE:  DUE TO MEDICAL ISSUE KAJAL CAN ONLY WORK 8 HOURS A DAY.

## 2023-05-23 ENCOUNTER — HOSPITAL ENCOUNTER (OUTPATIENT)
Dept: MAMMOGRAPHY | Facility: HOSPITAL | Age: 53
Discharge: HOME | End: 2023-05-23
Admitting: NURSE PRACTITIONER
Payer: COMMERCIAL

## 2023-05-23 DIAGNOSIS — Z12.31 BREAST CANCER SCREENING BY MAMMOGRAM: ICD-10-CM

## 2023-05-23 PROCEDURE — 77063 BREAST TOMOSYNTHESIS BI: CPT

## 2023-05-23 PROCEDURE — 77067 SCR MAMMO BI INCL CAD: CPT

## 2023-05-26 DIAGNOSIS — R92.8 ABNORMALITY OF RIGHT BREAST ON SCREENING MAMMOGRAM: Primary | ICD-10-CM

## 2023-06-01 ENCOUNTER — HOSPITAL ENCOUNTER (OUTPATIENT)
Dept: MAMMOGRAPHY | Facility: HOSPITAL | Age: 53
Discharge: HOME | End: 2023-06-01

## 2023-06-01 ENCOUNTER — HOSPITAL ENCOUNTER (OUTPATIENT)
Dept: ULTRASOUND IMAGING | Facility: HOSPITAL | Age: 53
Discharge: HOME | End: 2023-06-01

## 2023-06-01 DIAGNOSIS — R92.8 ABNORMALITY OF RIGHT BREAST ON SCREENING MAMMOGRAM: ICD-10-CM

## 2023-06-01 PROCEDURE — 77065 DX MAMMO INCL CAD UNI: CPT

## 2023-06-01 PROCEDURE — G0279 TOMOSYNTHESIS, MAMMO: HCPCS

## 2023-08-07 ENCOUNTER — TELEPHONE (OUTPATIENT)
Dept: FAMILY MEDICINE CLINIC | Facility: CLINIC | Age: 53
End: 2023-08-07

## 2023-08-07 NOTE — TELEPHONE ENCOUNTER
Caller: Joey Rush    Relationship to patient: Self    Best call back number: [phone]     Chief complaint: PATIENT NEEDS 2ND SHINGLES SHOT    Type of visit: NURSE/MA    Requested date: ASAP     Additional notes:PLEASE CALL PATIENT TO SCHEDULE

## 2024-03-14 ENCOUNTER — TELEPHONE (OUTPATIENT)
Dept: FAMILY MEDICINE CLINIC | Facility: CLINIC | Age: 54
End: 2024-03-14

## 2024-03-14 NOTE — TELEPHONE ENCOUNTER
Caller: CHIDI DE LEON    Relationship: Emergency Contact    Best call back number: [phone]    What form or medical record are you requesting: [facility] PAPERWORK DROPPED OFF ON 3/14/24    Who is requesting this form or medical record from you: EMPLOYER    How would you like to receive the form or medical records (pick-up, mail, fax):     Additional notes:CALLING TO INFORM OFFICE THAT DAUGHTER OLI CHACON GOES TO [facility] IN [place] [phone]

## 2024-04-22 ENCOUNTER — TELEPHONE (OUTPATIENT)
Dept: FAMILY MEDICINE CLINIC | Facility: CLINIC | Age: 54
End: 2024-04-22
Payer: COMMERCIAL

## 2024-04-22 DIAGNOSIS — I10 ESSENTIAL HYPERTENSION: ICD-10-CM

## 2024-04-22 NOTE — LETTER
April 23, 2024     Lauripatricia ZEYAD Victor Manuel  [address] [place] [address]    Patient: Joey Rush   YOB: 1970   Date of Visit: 4/22/2024        To whom it may concern I have changed the dates based on patients medical condition to not be able to return to work until 6/3/24 if any questions or concerns please contact our office.        PRAVEENA Hyde/ADALBERTO Solis Amanda, RegSched Rep  04/22/24 1531  Signed    Caller: Joey Rush    Relationship: Self    Best call back number: [phone]    What is the best time to reach you: ANYTIME    Who are you requesting to speak with (clinical staff, provider,  specific staff member): CLINICAL STAFF    Do you know the name of the person who called: PATIENT     What was the call regarding: PATIENT CALLED TO CHECK ON WHETHER HER FMLA FORMS WERE READY FOR PICKUP     Is it okay if the provider responds through MyChart: NO

## 2024-04-22 NOTE — TELEPHONE ENCOUNTER
Caller: Joey Rush    Relationship: Self    Best call back number: [phone]    What is the best time to reach you: ANYTIME    Who are you requesting to speak with (clinical staff, provider,  specific staff member): CLINICAL STAFF    Do you know the name of the person who called: PATIENT     What was the call regarding: PATIENT CALLED TO CHECK ON WHETHER HER FMLA FORMS WERE READY FOR PICKUP     Is it okay if the provider responds through MyChart: NO

## 2024-04-23 NOTE — TELEPHONE ENCOUNTER
Changed dates no VM set up for patient. Forms are up front ready for .   Thanks Besty!   I completely agree with that plan.  Just a heads up that she has seen Nydia in the distant past and I referred her back to Nydia about a year ago to discuss Tslim pumps w [employer] and she also failed that appointment with Nydia. Chantell is super nice to work with when you can get her in but that is a recurrent issue.  Amena

## 2024-04-24 RX ORDER — TRIAMTERENE AND HYDROCHLOROTHIAZIDE 75; 50 MG/1; MG/1
1 TABLET ORAL DAILY
Qty: 90 TABLET | Refills: 3 | Status: SHIPPED | OUTPATIENT
Start: 2024-04-24

## 2025-05-24 DIAGNOSIS — I10 ESSENTIAL HYPERTENSION: ICD-10-CM

## 2025-05-27 RX ORDER — TRIAMTERENE AND HYDROCHLOROTHIAZIDE 75; 50 MG/1; MG/1
1 TABLET ORAL DAILY
Qty: 90 TABLET | Refills: 3 | Status: SHIPPED | OUTPATIENT
Start: 2025-05-27 | End: 2025-06-02

## 2025-05-31 DIAGNOSIS — I10 ESSENTIAL HYPERTENSION: ICD-10-CM

## 2025-06-02 RX ORDER — TRIAMTERENE AND HYDROCHLOROTHIAZIDE 75; 50 MG/1; MG/1
1 TABLET ORAL DAILY
Qty: 90 TABLET | Refills: 3 | Status: SHIPPED | OUTPATIENT
Start: 2025-06-02

## 2025-07-02 ENCOUNTER — LAB (OUTPATIENT)
Dept: FAMILY MEDICINE CLINIC | Facility: CLINIC | Age: 55
End: 2025-07-02
Payer: COMMERCIAL

## 2025-07-02 ENCOUNTER — OFFICE VISIT (OUTPATIENT)
Dept: FAMILY MEDICINE CLINIC | Facility: CLINIC | Age: 55
End: 2025-07-02
Payer: COMMERCIAL

## 2025-07-02 VITALS
HEART RATE: 84 BPM | OXYGEN SATURATION: 96 % | HEIGHT: 67 IN | BODY MASS INDEX: 31.86 KG/M2 | DIASTOLIC BLOOD PRESSURE: 90 MMHG | SYSTOLIC BLOOD PRESSURE: 126 MMHG | WEIGHT: 203 LBS

## 2025-07-02 DIAGNOSIS — Z12.31 BREAST CANCER SCREENING BY MAMMOGRAM: ICD-10-CM

## 2025-07-02 DIAGNOSIS — Z12.11 COLON CANCER SCREENING: ICD-10-CM

## 2025-07-02 DIAGNOSIS — Z00.00 PREVENTATIVE HEALTH CARE: Primary | ICD-10-CM

## 2025-07-02 DIAGNOSIS — I10 ESSENTIAL HYPERTENSION: ICD-10-CM

## 2025-07-02 DIAGNOSIS — M54.16 LUMBAR RADICULOPATHY: ICD-10-CM

## 2025-07-02 DIAGNOSIS — R73.03 PREDIABETES: ICD-10-CM

## 2025-07-02 PROCEDURE — 36415 COLL VENOUS BLD VENIPUNCTURE: CPT | Performed by: NURSE PRACTITIONER

## 2025-07-02 PROCEDURE — 83036 HEMOGLOBIN GLYCOSYLATED A1C: CPT | Performed by: NURSE PRACTITIONER

## 2025-07-02 PROCEDURE — 80061 LIPID PANEL: CPT | Performed by: NURSE PRACTITIONER

## 2025-07-02 PROCEDURE — 80050 GENERAL HEALTH PANEL: CPT | Performed by: NURSE PRACTITIONER

## 2025-07-02 RX ORDER — HYDROCODONE BITARTRATE AND ACETAMINOPHEN 7.5; 325 MG/1; MG/1
1 TABLET ORAL
COMMUNITY
Start: 2025-06-21 | End: 2025-07-21

## 2025-07-02 NOTE — PROGRESS NOTES
Chief Complaint  Chief Complaint   Patient presents with    Annual Exam     Pt is doing well today  Last mammo 6/1/23  Can not remember last pap smear- get done thru gyn           Subjective          Joey Rush presents to [facility] for   History of Present Illness    Patient presents for annual exam, she is overall doing well but reports exhaustion due to working long hours, stress at home, taking care of her grandson. She has a hx of partial hysterectomy, pap is through GYN. Last mammogram 2023     HTN, stable on meds and takes as directed, denies chest pain, headache, shortness of air, palpitations and swelling of extremities.      Arthritis of multiple joints, uses meloxicam as needed. Goes to pain mgmt as well for chronic pain, lumbar radiculopathy/spondylosis, uses norco BID prn.  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 She has been provided FMLA in the past to work only 8-hour shifts and 5 days/week, she is requesting a renewal work note/FMLA. Pt's daughter was in a major semi accident and pt now has guardianship of her autistic grandson, she is requesting 2 days/mo for his care and medical appt's      The following portions of the patient's history were reviewed and updated as appropriate: allergies, current medications, past family history, past medical history, past social history, past surgical history and problem list.    Past Medical History:   Diagnosis Date    Allergic     Anxiety disorder     Contusion, knee     Convulsion     Dehydration     Depression     Dizziness     Fatigue     Heat exhaustion     Hypertension     Insomnia, persistent     Knee pain     Low back pain     Seizure      Past Surgical History:   Procedure Laterality Date    BREAST BIOPSY Left     HYSTERECTOMY  2002    PARTIAL UTERUS ONLY     Family History   Problem Relation Age of Onset    Breast cancer Mother 68    Breast  "cancer Paternal Aunt     Diabetes Other     Hypertension Other     Cancer Other      Social History     Tobacco Use    Smoking status: Never    Smokeless tobacco: Never   Substance Use Topics    Alcohol use: Yes     Comment: social       Current Outpatient Medications:     HYDROcodone-acetaminophen (NORCO) 7.5-325 MG per tablet, Take 1 tablet by mouth., Disp: , Rfl:     meloxicam (MOBIC) 7.5 MG tablet, Take 1 tablet by mouth Daily., Disp: 90 tablet, Rfl: 3    triamterene-hydrochlorothiazide (MAXZIDE) 75-50 MG per tablet, TAKE 1 TABLET BY MOUTH DAILY, Disp: 90 tablet, Rfl: 3    Objective   Vital Signs:   Vitals:    07/02/25 0824   BP: 126/90   BP Location: Left arm   Patient Position: Sitting   Cuff Size: Adult   Pulse: 84   SpO2: 96%   Weight: 92.1 kg (203 lb)   Height: 170.2 cm (67\")   PainSc: 0-No pain       Body mass index is 31.79 kg/m².    Physical Exam  Constitutional:       General: She is not in acute distress.     Appearance: Normal appearance. She is well-developed. She is not ill-appearing or diaphoretic.   HENT:      Head: Normocephalic.   Eyes:      Conjunctiva/sclera: Conjunctivae normal.      Pupils: Pupils are equal, round, and reactive to light.   Neck:      Thyroid: No thyromegaly.      Vascular: No JVD.   Cardiovascular:      Rate and Rhythm: Normal rate and regular rhythm.      Heart sounds: Normal heart sounds. No murmur heard.  Pulmonary:      Effort: Pulmonary effort is normal. No respiratory distress.      Breath sounds: Normal breath sounds. No wheezing or rhonchi.   Abdominal:      General: Bowel sounds are normal. There is no distension.      Palpations: Abdomen is soft.      Tenderness: There is no abdominal tenderness.   Musculoskeletal:         General: Tenderness (chronic multi joint, L-spine, mild craig rom) present. No swelling. Normal range of motion.      Cervical back: Normal range of motion and neck supple. No tenderness.   Lymphadenopathy:      Cervical: No cervical adenopathy. "   Skin:     General: Skin is warm and dry.      Coloration: Skin is not jaundiced.      Findings: No erythema or rash.   Neurological:      General: No focal deficit present.      Mental Status: She is alert and oriented to person, place, and time. Mental status is at baseline.      Sensory: No sensory deficit.      Motor: No weakness.      Gait: Gait normal.   Psychiatric:         Mood and Affect: Mood normal.         Behavior: Behavior normal.         Thought Content: Thought content normal.         Judgment: Judgment normal.          Result Review :     No visits with results within 7 Day(s) from this visit.   Latest known visit with results is:   Office Visit on 05/03/2023   Component Date Value Ref Range Status    Total Cholesterol 05/03/2023 255 (H)  0 - 200 mg/dL Final    Triglycerides 05/03/2023 161 (H)  0 - 150 mg/dL Final    HDL Cholesterol 05/03/2023 72 (H)  40 - 60 mg/dL Final    LDL Cholesterol  05/03/2023 154 (H)  0 - 100 mg/dL Final    VLDL Cholesterol 05/03/2023 29  5 - 40 mg/dL Final    LDL/HDL Ratio 05/03/2023 2.09   Final    Glucose 05/03/2023 89  65 - 99 mg/dL Final    BUN 05/03/2023 10  6 - 20 mg/dL Final    Creatinine 05/03/2023 0.78  0.57 - 1.00 mg/dL Final    Sodium 05/03/2023 138  136 - 145 mmol/L Final    Potassium 05/03/2023 4.1  3.5 - 5.2 mmol/L Final    Chloride 05/03/2023 105  98 - 107 mmol/L Final    CO2 05/03/2023 23.7  22.0 - 29.0 mmol/L Final    Calcium 05/03/2023 9.8  8.6 - 10.5 mg/dL Final    Total Protein 05/03/2023 7.2  6.0 - 8.5 g/dL Final    Albumin 05/03/2023 4.4  3.5 - 5.2 g/dL Final    ALT (SGPT) 05/03/2023 28  1 - 33 U/L Final    AST (SGOT) 05/03/2023 20  1 - 32 U/L Final    Alkaline Phosphatase 05/03/2023 53  39 - 117 U/L Final    Total Bilirubin 05/03/2023 <0.2  0.0 - 1.2 mg/dL Final    Globulin 05/03/2023 2.8  gm/dL Final    A/G Ratio 05/03/2023 1.6  g/dL Final    BUN/Creatinine Ratio 05/03/2023 12.8  7.0 - 25.0 Final    Anion Gap 05/03/2023 9.3  5.0 - 15.0 mmol/L Final     eGFR 05/03/2023 92.1  >60.0 mL/min/1.73 Final    WBC 05/03/2023 3.00 (L)  3.40 - 10.80 10*3/mm3 Final    RBC 05/03/2023 4.49  3.77 - 5.28 10*6/mm3 Final    Hemoglobin 05/03/2023 13.5  12.0 - 15.9 g/dL Final    Hematocrit 05/03/2023 40.0  34.0 - 46.6 % Final    MCV 05/03/2023 89.1  79.0 - 97.0 fL Final    MCH 05/03/2023 30.1  26.6 - 33.0 pg Final    MCHC 05/03/2023 33.8  31.5 - 35.7 g/dL Final    RDW 05/03/2023 12.2 (L)  12.3 - 15.4 % Final    RDW-SD 05/03/2023 39.9  37.0 - 54.0 fl Final    MPV 05/03/2023 9.8  6.0 - 12.0 fL Final    Platelets 05/03/2023 336  140 - 450 10*3/mm3 Final    TSH 05/03/2023 1.550  0.270 - 4.200 uIU/mL Final    Hemoglobin A1C 05/03/2023 6.00 (H)  4.80 - 5.60 % Final                  BMI is >= 30 and <35. (Class 1 Obesity). The following options were offered after discussion;: exercise counseling/recommendations and nutrition counseling/recommendations           Assessment and Plan    Diagnoses and all orders for this visit:    1. Preventative health care (Primary)  -     Lipid Panel  -     Comprehensive Metabolic Panel  -     CBC (No Diff)  -     TSH  -     Hemoglobin A1c    2. Colon cancer screening  -     Cologuard - Stool, Per Rectum; Future    3. Breast cancer screening by mammogram  -     Mammo Screening Digital Tomosynthesis Bilateral With CAD; Future    4. Essential hypertension  -     Lipid Panel  -     Comprehensive Metabolic Panel  -     CBC (No Diff)  -     TSH    5. Lumbar radiculopathy    6. Prediabetes  -     Hemoglobin A1c    Other orders  -     Pneumococcal Conjugate Vaccine 20-Valent All        Provided letter today for 8-hour shifts and 5 days/week max  For her autistic grandson Valdez, will allow 2 days/mo off for his care-she will bring FMLA forms if needed  Return for shingrix #2  Labs today, will notify results  Pt to schedule pap with GYN  Age appropriate preventative counseling provided, including healthy lifestyle modifications and exercise        I spent 30  minutes caring for Joey Rush on this date of service. This time includes time spent by me in the following activities: preparing for the visit, reviewing tests, performing a medically appropriate examination and/or evaluation , counseling and educating the patient/family/caregiver, ordering medications, tests, or procedures and documenting information in the medical record        Follow Up     Return in about 1 year (around 7/2/2026) for Annual physical. pt to RTC on a friday for shingrix #2.  Patient was given instructions and counseling regarding her condition or for health maintenance advice. Please see specific information pulled into the AVS if appropriate.        Part of this note may be an electronic transcription/translation of spoken language to printed text using the Dragon Dictation System

## 2025-07-03 LAB
ALBUMIN SERPL-MCNC: 4.6 G/DL (ref 3.5–5.2)
ALBUMIN/GLOB SERPL: 1.4 G/DL
ALP SERPL-CCNC: 52 U/L (ref 39–117)
ALT SERPL W P-5'-P-CCNC: 29 U/L (ref 1–33)
ANION GAP SERPL CALCULATED.3IONS-SCNC: 13 MMOL/L (ref 5–15)
AST SERPL-CCNC: 29 U/L (ref 1–32)
BILIRUB SERPL-MCNC: 0.3 MG/DL (ref 0–1.2)
BUN SERPL-MCNC: 16 MG/DL (ref 6–20)
BUN/CREAT SERPL: 19.8 (ref 7–25)
CALCIUM SPEC-SCNC: 9.9 MG/DL (ref 8.6–10.5)
CHLORIDE SERPL-SCNC: 101 MMOL/L (ref 98–107)
CHOLEST SERPL-MCNC: 285 MG/DL (ref 0–200)
CO2 SERPL-SCNC: 25 MMOL/L (ref 22–29)
CREAT SERPL-MCNC: 0.81 MG/DL (ref 0.57–1)
DEPRECATED RDW RBC AUTO: 39.8 FL (ref 37–54)
EGFRCR SERPLBLD CKD-EPI 2021: 86.9 ML/MIN/1.73
ERYTHROCYTE [DISTWIDTH] IN BLOOD BY AUTOMATED COUNT: 12.1 % (ref 12.3–15.4)
GLOBULIN UR ELPH-MCNC: 3.2 GM/DL
GLUCOSE SERPL-MCNC: 98 MG/DL (ref 65–99)
HBA1C MFR BLD: 6.2 % (ref 4.8–5.6)
HCT VFR BLD AUTO: 40.1 % (ref 34–46.6)
HDLC SERPL-MCNC: 66 MG/DL (ref 40–60)
HGB BLD-MCNC: 13.4 G/DL (ref 12–15.9)
LDLC SERPL CALC-MCNC: 201 MG/DL (ref 0–100)
LDLC/HDLC SERPL: 3 {RATIO}
MCH RBC QN AUTO: 30.5 PG (ref 26.6–33)
MCHC RBC AUTO-ENTMCNC: 33.4 G/DL (ref 31.5–35.7)
MCV RBC AUTO: 91.3 FL (ref 79–97)
PLATELET # BLD AUTO: 320 10*3/MM3 (ref 140–450)
PMV BLD AUTO: 9.8 FL (ref 6–12)
POTASSIUM SERPL-SCNC: 4.1 MMOL/L (ref 3.5–5.2)
PROT SERPL-MCNC: 7.8 G/DL (ref 6–8.5)
RBC # BLD AUTO: 4.39 10*6/MM3 (ref 3.77–5.28)
SODIUM SERPL-SCNC: 139 MMOL/L (ref 136–145)
TRIGL SERPL-MCNC: 106 MG/DL (ref 0–150)
TSH SERPL DL<=0.05 MIU/L-ACNC: 2.1 UIU/ML (ref 0.27–4.2)
VLDLC SERPL-MCNC: 18 MG/DL (ref 5–40)
WBC NRBC COR # BLD AUTO: 3.81 10*3/MM3 (ref 3.4–10.8)

## 2025-07-12 ENCOUNTER — RESULTS FOLLOW-UP (OUTPATIENT)
Dept: FAMILY MEDICINE CLINIC | Facility: CLINIC | Age: 55
End: 2025-07-12

## 2025-07-25 ENCOUNTER — CLINICAL SUPPORT (OUTPATIENT)
Dept: FAMILY MEDICINE CLINIC | Facility: CLINIC | Age: 55
End: 2025-07-25
Payer: COMMERCIAL

## 2025-07-30 ENCOUNTER — TELEPHONE (OUTPATIENT)
Dept: FAMILY MEDICINE CLINIC | Facility: CLINIC | Age: 55
End: 2025-07-30

## 2025-07-31 ENCOUNTER — OFFICE VISIT (OUTPATIENT)
Dept: FAMILY MEDICINE CLINIC | Facility: CLINIC | Age: 55
End: 2025-07-31
Payer: COMMERCIAL

## 2025-07-31 ENCOUNTER — PATIENT ROUNDING (BHMG ONLY) (OUTPATIENT)
Dept: FAMILY MEDICINE CLINIC | Facility: CLINIC | Age: 55
End: 2025-07-31
Payer: COMMERCIAL

## 2025-07-31 VITALS
DIASTOLIC BLOOD PRESSURE: 68 MMHG | BODY MASS INDEX: 30.79 KG/M2 | SYSTOLIC BLOOD PRESSURE: 102 MMHG | OXYGEN SATURATION: 96 % | HEIGHT: 67 IN | WEIGHT: 196.2 LBS | HEART RATE: 74 BPM | RESPIRATION RATE: 18 BRPM

## 2025-07-31 DIAGNOSIS — R22.2 SUPRACLAVICULAR MASS: ICD-10-CM

## 2025-07-31 DIAGNOSIS — S93.401A SPRAIN OF RIGHT ANKLE, UNSPECIFIED LIGAMENT, INITIAL ENCOUNTER: Primary | ICD-10-CM

## 2025-07-31 RX ORDER — HYDROCODONE BITARTRATE AND ACETAMINOPHEN 7.5; 325 MG/1; MG/1
1 TABLET ORAL
COMMUNITY
Start: 2025-07-22 | End: 2025-08-21

## 2025-07-31 NOTE — PROGRESS NOTES
07/31/25       My name is September and I am  with [facility].    I am writing to ask about your recent visit.    Tell me about your visit with us. What things went well?    We're always looking for ways to make our patients' experiences even better. Do you have recommendations on ways we may improve?     Overall were you satisfied with your visit to our practice?     Thank you for taking the time to answer a few questions today.     Thank you, and have a great day.  September

## 2025-07-31 NOTE — PROGRESS NOTES
Chief Complaint  Chief Complaint   Patient presents with    Ankle Pain     ED Follow from 7/29 for Right Ankle Pain    Mass     2 small masses around the collar bone on the left side           Subjective          Joey Rush presents to [facility] for   History of Present Illness    Patient presented to [place] ED per EMS on 7/29/2025 for right ankle pain and inability to walk after a fall off of a porch while working. She works as a  and walks her routes.  Xray showed soft tissue swelling, no other findings, she was placed in a cam boot, recommended nsaids, ice, rest. She is off work currently, will need a letter and possibly FMLA.  She has been using ibuprofen and norco she gets from pain mgmt.     Also has complaints of two masses just above the left collarbone, one has been present for about a year and enlarging, the other she noticed in the last 2 weeks. They are nontender, she denies any acute or recent illness, denies axillary/groin tenderness or masses      The following portions of the patient's history were reviewed and updated as appropriate: allergies, current medications, past family history, past medical history, past social history, past surgical history and problem list.    Past Medical History:   Diagnosis Date    Allergic     Anxiety disorder     Contusion, knee     Convulsion     Dehydration     Depression     Dizziness     Fatigue     Heat exhaustion     Hypertension     Insomnia, persistent     Knee pain     Low back pain     Seizure      Past Surgical History:   Procedure Laterality Date    BREAST BIOPSY Left     HYSTERECTOMY  2002    PARTIAL UTERUS ONLY     Family History   Problem Relation Age of Onset    Breast cancer Mother 68    Breast cancer Paternal Aunt     Diabetes Other     Hypertension Other     Cancer Other      Social History     Tobacco Use    Smoking status: Never     Passive exposure: Past    Smokeless tobacco: Never  "  Substance Use Topics    Alcohol use: Yes     Comment: social       Current Outpatient Medications:     HYDROcodone-acetaminophen (NORCO) 7.5-325 MG per tablet, Take 1 tablet by mouth., Disp: , Rfl:     meloxicam (MOBIC) 7.5 MG tablet, Take 1 tablet by mouth Daily., Disp: 90 tablet, Rfl: 3    triamterene-hydrochlorothiazide (MAXZIDE) 75-50 MG per tablet, TAKE 1 TABLET BY MOUTH DAILY, Disp: 90 tablet, Rfl: 3    Objective   Vital Signs:   Vitals:    07/31/25 1053   BP: 102/68   BP Location: Left arm   Patient Position: Sitting   Cuff Size: Large Adult   Pulse: 74   Resp: 18   SpO2: 96%   Weight: 89 kg (196 lb 3.2 oz)   Height: 170.2 cm (67.01\")   PainSc: 6    PainLoc: Ankle       Body mass index is 30.72 kg/m².    Physical Exam  Vitals and nursing note reviewed.   Constitutional:       General: She is not in acute distress.     Appearance: She is well-developed. She is not diaphoretic.   Neck:      Thyroid: No thyromegaly.      Vascular: No JVD.   Musculoskeletal:         General: Swelling, tenderness and signs of injury (R lateral malleolus edema and severe ttp, severe craig rom. wearing mid leg cam boot) present. Normal range of motion.   Lymphadenopathy:      Upper Body:      Left upper body: Supraclavicular adenopathy (x2 masses present, firm, mobile) present.   Skin:     General: Skin is warm and dry.      Findings: No bruising.   Neurological:      Mental Status: She is alert and oriented to person, place, and time.      Sensory: No sensory deficit.   Psychiatric:         Behavior: Behavior normal.         Thought Content: Thought content normal.         Judgment: Judgment normal.          Result Review :     No visits with results within 7 Day(s) from this visit.   Latest known visit with results is:   Office Visit on 07/02/2025   Component Date Value Ref Range Status    Total Cholesterol 07/02/2025 285 (H)  0 - 200 mg/dL Final    Triglycerides 07/02/2025 106  0 - 150 mg/dL Final    HDL Cholesterol 07/02/2025 66 " (H)  40 - 60 mg/dL Final    LDL Cholesterol  07/02/2025 201 (H)  0 - 100 mg/dL Final    VLDL Cholesterol 07/02/2025 18  5 - 40 mg/dL Final    LDL/HDL Ratio 07/02/2025 3.00   Final    Glucose 07/02/2025 98  65 - 99 mg/dL Final    BUN 07/02/2025 16.0  6.0 - 20.0 mg/dL Final    Creatinine 07/02/2025 0.81  0.57 - 1.00 mg/dL Final    Sodium 07/02/2025 139  136 - 145 mmol/L Final    Potassium 07/02/2025 4.1  3.5 - 5.2 mmol/L Final    Chloride 07/02/2025 101  98 - 107 mmol/L Final    CO2 07/02/2025 25.0  22.0 - 29.0 mmol/L Final    Calcium 07/02/2025 9.9  8.6 - 10.5 mg/dL Final    Total Protein 07/02/2025 7.8  6.0 - 8.5 g/dL Final    Albumin 07/02/2025 4.6  3.5 - 5.2 g/dL Final    ALT (SGPT) 07/02/2025 29  1 - 33 U/L Final    AST (SGOT) 07/02/2025 29  1 - 32 U/L Final    Alkaline Phosphatase 07/02/2025 52  39 - 117 U/L Final    Total Bilirubin 07/02/2025 0.3  0.0 - 1.2 mg/dL Final    Globulin 07/02/2025 3.2  gm/dL Final    A/G Ratio 07/02/2025 1.4  g/dL Final    BUN/Creatinine Ratio 07/02/2025 19.8  7.0 - 25.0 Final    Anion Gap 07/02/2025 13.0  5.0 - 15.0 mmol/L Final    eGFR 07/02/2025 86.9  >60.0 mL/min/1.73 Final    WBC 07/02/2025 3.81  3.40 - 10.80 10*3/mm3 Final    RBC 07/02/2025 4.39  3.77 - 5.28 10*6/mm3 Final    Hemoglobin 07/02/2025 13.4  12.0 - 15.9 g/dL Final    Hematocrit 07/02/2025 40.1  34.0 - 46.6 % Final    MCV 07/02/2025 91.3  79.0 - 97.0 fL Final    MCH 07/02/2025 30.5  26.6 - 33.0 pg Final    MCHC 07/02/2025 33.4  31.5 - 35.7 g/dL Final    RDW 07/02/2025 12.1 (L)  12.3 - 15.4 % Final    RDW-SD 07/02/2025 39.8  37.0 - 54.0 fl Final    MPV 07/02/2025 9.8  6.0 - 12.0 fL Final    Platelets 07/02/2025 320  140 - 450 10*3/mm3 Final    TSH 07/02/2025 2.100  0.270 - 4.200 uIU/mL Final    Hemoglobin A1C 07/02/2025 6.20 (H)  4.80 - 5.60 % Final                              Assessment and Plan    Diagnoses and all orders for this visit:    1. Sprain of right ankle, unspecified ligament, initial encounter  (Primary)  -     Ambulatory Referral to Podiatry    2. 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 This time includes time spent by me in the following activities: preparing for the visit, reviewing tests, performing a medically appropriate examination and/or evaluation , counseling and educating the patient/family/caregiver, ordering medications, tests, or procedures and documenting information in the medical record        Follow Up     Return for Next scheduled follow up or earlier if needed.  Patient was given instructions and counseling regarding her condition or for health maintenance advice. Please see specific information pulled into the AVS if appropriate.        Part of this note may be an electronic transcription/translation of spoken language to printed text using the Dragon Dictation System

## 2025-07-31 NOTE — LETTER
July 31, 2025     Patient: Joey Rush   YOB: 1970   Date of Visit: 7/31/2025       To Whom It May Concern:    It is my medical opinion that Joey Rush should remain out of work until 8/14/25.  She has been referred to podiaotry for further evaluation. Recommendations will be made at that time and we will update her time off as needed.          Sincerely,         PRAVEENA Hyde    CC: No Recipients

## 2025-08-18 LAB
NCCN CRITERIA FLAG: NORMAL
TYRER CUZICK SCORE: 19.9

## 2025-08-19 ENCOUNTER — HOSPITAL ENCOUNTER (OUTPATIENT)
Dept: ULTRASOUND IMAGING | Facility: HOSPITAL | Age: 55
Discharge: HOME | End: 2025-08-19
Payer: COMMERCIAL

## 2025-08-19 ENCOUNTER — HOSPITAL ENCOUNTER (OUTPATIENT)
Dept: MAMMOGRAPHY | Facility: HOSPITAL | Age: 55
Discharge: HOME | End: 2025-08-19
Payer: COMMERCIAL

## 2025-08-19 DIAGNOSIS — Z12.31 BREAST CANCER SCREENING BY MAMMOGRAM: ICD-10-CM

## 2025-08-19 DIAGNOSIS — R22.2 SUPRACLAVICULAR MASS: ICD-10-CM

## 2025-08-19 PROCEDURE — 76536 US EXAM OF HEAD AND NECK: CPT

## 2025-08-19 PROCEDURE — 77067 SCR MAMMO BI INCL CAD: CPT

## 2025-08-19 PROCEDURE — 77063 BREAST TOMOSYNTHESIS BI: CPT

## 2025-08-21 ENCOUNTER — OFFICE VISIT (OUTPATIENT)
Age: 55
End: 2025-08-21
Payer: OTHER MISCELLANEOUS

## 2025-08-21 VITALS — BODY MASS INDEX: 30.76 KG/M2 | OXYGEN SATURATION: 96 % | HEART RATE: 100 BPM | WEIGHT: 196 LBS | HEIGHT: 67 IN

## 2025-08-21 DIAGNOSIS — M25.571 ACUTE RIGHT ANKLE PAIN: Primary | ICD-10-CM

## 2025-08-21 DIAGNOSIS — S93.401A SEVERE ANKLE SPRAIN, RIGHT, INITIAL ENCOUNTER: ICD-10-CM
